# Patient Record
Sex: MALE | Race: WHITE | Employment: FULL TIME | URBAN - METROPOLITAN AREA
[De-identification: names, ages, dates, MRNs, and addresses within clinical notes are randomized per-mention and may not be internally consistent; named-entity substitution may affect disease eponyms.]

---

## 2020-06-25 PROBLEM — E66.01 MORBID OBESITY DUE TO EXCESS CALORIES (HCC): Status: ACTIVE | Noted: 2020-06-25

## 2020-06-25 PROBLEM — N28.9 KIDNEY DISEASE: Status: ACTIVE | Noted: 2020-06-25

## 2020-06-25 PROBLEM — I10 ESSENTIAL HYPERTENSION: Status: ACTIVE | Noted: 2020-06-25

## 2020-08-11 ENCOUNTER — HOSPITAL ENCOUNTER (OUTPATIENT)
Dept: GENERAL RADIOLOGY | Age: 45
Discharge: HOME | End: 2020-08-11
Attending: NURSE PRACTITIONER
Payer: COMMERCIAL

## 2020-08-11 ENCOUNTER — HOSPITAL ENCOUNTER (OUTPATIENT)
Dept: LAB | Age: 45
Discharge: HOME | End: 2020-08-11
Payer: COMMERCIAL

## 2020-08-11 DIAGNOSIS — Z87.19 H/O ESOPHAGEAL REFLUX: ICD-10-CM

## 2020-08-11 DIAGNOSIS — Z01.812 ENCOUNTER FOR PRE-OPERATIVE LABORATORY TESTING: ICD-10-CM

## 2020-08-11 LAB
25(OH)D3 SERPL-MCNC: 26.8 NG/ML (ref 30–100)
EST. AVERAGE GLUCOSE BLD GHB EST-MCNC: 128 MG/DL
HBA1C MFR BLD: 6.1 %
TSH SERPL DL<=0.005 MIU/L-ACNC: 2.32 UIU/ML

## 2020-08-11 PROCEDURE — 36415 COLL VENOUS BLD VENIPUNCTURE: CPT

## 2020-08-11 PROCEDURE — 74011000255 HC RX REV CODE- 255: Performed by: NURSE PRACTITIONER

## 2020-08-11 PROCEDURE — 82306 VITAMIN D 25 HYDROXY: CPT

## 2020-08-11 PROCEDURE — 84443 ASSAY THYROID STIM HORMONE: CPT

## 2020-08-11 PROCEDURE — 83036 HEMOGLOBIN GLYCOSYLATED A1C: CPT

## 2020-08-11 PROCEDURE — 74246 X-RAY XM UPR GI TRC 2CNTRST: CPT

## 2020-08-11 PROCEDURE — 80323 ALKALOIDS NOS: CPT

## 2020-08-11 PROCEDURE — 74011000250 HC RX REV CODE- 250: Performed by: NURSE PRACTITIONER

## 2020-08-11 RX ADMIN — BARIUM SULFATE 135 ML: 980 POWDER, FOR SUSPENSION ORAL at 10:35

## 2020-08-11 RX ADMIN — ANTACID/ANTIFLATULENT 4 G: 380; 550; 10; 10 GRANULE, EFFERVESCENT ORAL at 10:35

## 2020-08-11 RX ADMIN — BARIUM SULFATE 355 ML: 0.6 SUSPENSION ORAL at 10:35

## 2020-08-16 LAB
COTININE SERPL-MCNC: <1 NG/ML
NICOTINE SERPL-MCNC: <1 NG/ML

## 2020-08-17 ENCOUNTER — HOSPITAL ENCOUNTER (OUTPATIENT)
Dept: PHYSICAL THERAPY | Age: 45
Discharge: HOME | End: 2020-08-17
Attending: NURSE PRACTITIONER
Payer: COMMERCIAL

## 2020-08-17 DIAGNOSIS — E66.01 MORBID OBESITY (HCC): ICD-10-CM

## 2020-08-17 PROCEDURE — 97161 PT EVAL LOW COMPLEX 20 MIN: CPT

## 2020-08-17 PROCEDURE — 97110 THERAPEUTIC EXERCISES: CPT

## 2020-08-17 NOTE — PROGRESS NOTES
Gustavo Preston  : 1975  Payor: Shana Holley / Plan: [place] OF [address] [place] [address] Rd / Product Type: PPO /  [facility]  [address] [place] [address], [address], [address] [facility]  [phone]   [phone]       OUTPATIENT PHYSICAL THERAPY: Daily Treatment Note 2020  Visit Count: 1  ICD-10: Treatment Diagnosis: Low back pain (M54.5), Pain in left leg (M79.605)  Precautions/Allergies: NKDA      TREATMENT PLAN:  Effective Dates: 2020 TO 10/16/2020 (60 days). Frequency/Duration:PRN for 30 Day(s) MEDICAL/REFERRING DIAGNOSIS:  Morbid obesity ([address] [E66.01]    DATE OF ONSET: gradual onset  REFERRING PHYSICIAN: Matheus Donahue, *  MD Orders: Juan David Miller and treat  Return MD Appointment: 20          Pre-treatment Symptoms/Complaints:  See initial evaluation  Pain: Initial: Pain Intensity 1: 2(2 now and at best, 8 at worst)   Post Session:  Pain not rated at end of session   Medications Last Reviewed:  20    Updated Objective Findings:   See evaluation note from today     TREATMENT:     Therapeutic Exercise: (10 Minutes):  Exercises to improve mobility and strength. Required moderate visual and verbal cues to instruct in exercises. Progressed complexity of movement as indicated. Patient was instructed in seated hamstring stretch with 30 second hold, trunk flex will ball and trunk diagonals with ball, and side lying L hip abduction. He performed reps of each to show sufficient understanding. HEP: as above  [employer]    Treatment/Session Summary:    · Response to Treatment:  no increased pain reported. .  · Communication/Consultation:  None today  · Equipment provided today:  None today  · Recommendations/Intent for next treatment session: patient need not return to PT unless follow up is desired.      Total Treatment Billable Duration:  10 minutes exercise, 30 minutes evaluation  PT Patient Time In/Time Out  Time In: 1000  Time Out: Rebecca 180 RADHA SANTAMARIA PT

## 2020-08-17 NOTE — THERAPY EVALUATION
Dina Allen  : 1975  Primary: [facility] [facility] [place]*  Secondary:  [facility]  [address] [facility], [address] [place] [address],[address], [place] [address].  [phone]   [phone]       OUTPATIENT PHYSICAL THERAPY:Initial Assessment 2020     ICD-10: Treatment Diagnosis: Low back pain (M54.5), Pain in left leg (M79.605)  Precautions/Allergies: NKDA      TREATMENT PLAN:  Effective Dates: 2020 TO 2020 (30 days). Frequency/Duration:PRN for 30 Day(s) MEDICAL/REFERRING DIAGNOSIS:  Morbid obesity ([address] [E66.01]    DATE OF ONSET: gradual onset  REFERRING PHYSICIAN: Kalee Dowd, *  MD Orders: Sonia Herrera and treat  Return MD Appointment: 20     INITIAL ASSESSMENT:  Mr. Radha Reyes is a 39year old male who comes to PT in anticipation of having bariatric surgery. He presents with very tight hamstrings, pain in low back and leg with history of several back surgeries, weak abdominals and weakness in L LE. He was given HEP to work on in anticipation of bariatric surgery, He is to contact PT within 30 days if he feels he needs clarification of HEP or return visit to PT. Otherwise he will be discharged at the end of 30 days. PROBLEM LIST (Impacting functional limitations):  1. Decreased Strength  2. Increased Pain  3. Decreased Flexibility/Joint Mobility INTERVENTIONS PLANNED: (Treatment may consist of any combination of the following)  1. Home Exercise Program (HEP)  2. Therapeutic Exercise/Strengthening     GOALS: (Goals have been discussed and agreed upon with patient.)  Patient's stated goal is to lessen his back pain  Short-Term Functional Goals: None set  Discharge Goals: Time Frame: 30 days  1.  Patient to be independent with HEP    OUTCOME MEASURE:   Tool Used: Lower Extremity Functional Scale (LEFS)  Score:  Initial:  Most Recent:  (Date: -- )   Interpretation of Score: 20 questions each scored on a 5 point scale with 0 representing \"extreme difficulty or unable to perform\" and 4 representing \"no difficulty\". The lower the score, the greater the functional disability. 80/80 represents no disability. Minimal detectable change is 9 points. MEDICAL NECESSITY:   · Patient demonstrates good rehab potential due to higher previous functional level. REASON FOR SERVICES/OTHER COMMENTS:  · Patient continues to require skilled intervention due to desire to return to higher level of function. .  Total Duration:  30 minuets evaluation, 10 minutes exercise  PT Patient Time In/Time Out  Time In: 1000  Time Out: 1040    Rehabilitation Potential For Stated Goals: Good  Regarding [employer] therapy, I certify that the treatment plan above will be carried out by a therapist or under their direction. Thank you for this referral,    Quan Lechuga, PT      Referring Physician Signature: Yesika Parada, * No Signature is Required for this note. PAIN/SUBJECTIVE:   Initial: Pain Intensity 1: 2(2 now and at best, 8 at worst)   Post Session:  No change noted by patient   HISTORY:   History of Injury/Illness (Reason for Referral):  Patient reports gradual gaining of weight since high school. He is unable to walk as much as he would like to lose weight because of history of multiple back surgeries. He also has a spinal cord stimulator,  Past Medical History/Comorbidities: Morbid obesity, renal disease, HTN. Back surgeries in 2001, 2002, 2009 and spinal cord stimulator in 2012. Social History/Living Environment:     lives with family in one story home with 2 entry steps. Prior Level of Function/Work/Activity:  Works as a pawn      Ambulatory/Rehab [address] Factors:       (1)  Gender [Male]    Ability to Rise from [address]:       (1)  Pushes up, successful in one attempt   [address]:       No modifications necessary   Total: (5 or greater = High Risk): 2   ©2010 [employer] of [employer] Patent #[id number]. Federal Law prohibits the replication, distribution or use without written permission from [facility] of [address]   Current Medications: from EMR: Catapres, Bystolic, Amlodipine   Date Last Reviewed:  8/17/20   Number of Personal Factors/Comorbidities that affect the Plan of Care: 0: LOW COMPLEXITY       EXAMINATION:     Observation/Orthostatic Postural Assessment: Patient with morbid obesity. Palpation:  Pelvic landmarks symmetrical.     ROM: B UEs and LEs WFL except as limited by adipose tissue. Very tight in B hamstrings. Some limitations to lumbar forward flexion. Strength: generally WFL in B UEs and R LE. Some weakness in L knee flex and extn, L DF, L hip abduction. Weakness in upper and lower abs. Neurological Screen: light touch intact in B UEs and LEs  Functional Mobility:  independent       Balance:  SLS greater than 5 seconds each leg. Body Structures Involved:  1. Joints  2. Muscles Body Functions Affected:  1. Neuromusculoskeletal  2. Movement Related Activities and Participation Affected:  1. General Tasks and Demands  2.  Interpersonal Interactions and Relationships   Number of elements (examined above) that affect the Plan of Care: 1-2: LOW COMPLEXITY   CLINICAL PRESENTATION:   Presentation: Evolving clinical presentation with changing clinical characteristics: MODERATE COMPLEXITY   CLINICAL DECISION MAKING:   Use of outcome tool(s) and clinical judgement create a POC that gives a: Questionable prediction of patient's progress: MODERATE COMPLEXITY

## 2020-12-07 NOTE — H&P (VIEW-ONLY)
Chriss Brandon MD 
                                [address] [facility], [address] [facility] [address] Phone [phone], Fax [phone] History and Physical 
 
Patient: Earl Dotson MRN: [id number]  SSN: [id number] YOB: 1975  Age: 39 y.o. Sex: male PCP: Tayla Hernandez NP Date:  12/8/2020 Earl Dotson returns to the [facility] after successful completion of our multidisciplinary surgical prep program.  This is his final consultation preparing him for laparoscopic sleeve gastrectomy surgery. He presents with a height of 5' 10\" (1.778 m) and weight of 330 lb (149.7 kg), giving him a Body mass index is 47.35 kg/m². He has an Ideal body weight of 169 lbs, and excess body weight of 161 lbs. He started our program with a weight of 318 lbs. He has completed all aspects of our prep program and has been deemed an acceptable candidate for bariatric surgery. PSYCHOLOGICAL EVALUATION:   Completed with [facility] on 11/16/2020 deeming him and appropriate surgical candidate. DIETITIAN EVALUATION:  Completed with [facility] deeming him an appropriate surgical candidate. BARIATRIC LABS:   
Component Latest Ref Rng & Units 8/11/2020 8/11/2020 8/11/2020 8/11/2020  
 
     11:25 AM 11:25 AM 11:25 AM 11:25 AM  
Nicotine 
    ng/mL <1.0 Cotinine 
    ng/mL <1.0 Hemoglobin A1c, (calculated) 
    %    6.1 Est. average glucose 
    mg/dL    128 Vitamin D 25-Hydroxy 30.0 - 100.0 ng/mL  26.8 (L) TSH 
    uIU/mL   2.320 Previously discussed low Vitamin D level on another visit. UPPER GI:  Completed 08/11/2020 History: Prebariatric surgery EXAM: Upper GI with KUB FINDINGS: The lung bases are clear. The bowel gas pattern is nonspecific. 1.6 minutes fluoroscopy time is utilized. 16 fluoroscopic images are available for review. The patient ingested effervescent granules followed by thick and 
thin consistencies of barium under direct fluoroscopic evaluation. Contrast flows through the esophagus into the stomach without difficulty. There 
is no esophageal mass, ulceration, or stricture. There is trace gastroesophageal 
reflux. Contrast flows through the stomach into the proximal small bowel without 
difficulty. No gastric mass, polyp, or ulceration. The duodenal bulb and sweep 
are normal. 
  
IMPRESSION IMPRESSION: Trace gastroesophageal reflux. No additional abnormality. PHYSICAL THERAPY EVALUATION FOR MOBILITY OPTIMIZATION:   Completed 08/17/2020 by Laqueta Soulier, PT, documentation in note section of chart We have reviewed the procedure again today and completed our standard pre op teaching. His questions were answered and he is ready to schedule surgery. We have discussed the procedure, diet and exercise regimens, and potential complications of surgery. The patient understands the nature and potential complication of surgery and has the capacity to follow the post operative diet, exercise, and nutritional requirements. After review, he has signed his surgical consent today. MEDICAL HISTORY: 
Morbid Obesity Kidney Disease Chronic Back pain- Sees pain management  
  
Comorbidity Yes or No  
Obstructive Sleep Apnea CPAP/BIPAP use= No  
Diabetes Mellitus Insulin dependent = Last A1c= No  
Hypertension- 
Number of medications=4 Yes Gastroesophageal Reflux Treatment Med= No  
Hyperlipidemia with medication No  
Coronary Artery Disease No  
Cancer No  
Asthma No  
Osteoarthritis No  
Joint Pain No  
  
  
Other Yes or No  
Venous Stasis No  
Dialysis No  
Long term use of steroids or immunocompromised conditions No  
On Anticoagulants No  
  
  
  
PRIOR WEIGHT LOSS ATTEMPTS:  4-10 attempts including exercise, diet modifications 
  
 EXERCISE ASSESSMENT:  None Currently, will review NIH Guidelines with patient.  
  
DENTAL ISSUES:  No dental issues with chewing  
  
PSYCHOSOCIAL: 
He notes he  is  and states main support person is [employer] (Spouse). He is Disabled. His goal in pursuing surgical weight loss is to improve health. Denies history of mental health disorders. He states he is independent in his care, can drive a car, and can ambulate without assistance. 
  
 
 
HISTORY: 
Past Medical History:  
Diagnosis Date  Morbid obesity ([address]  Renal disease He denies personal or family history of problems with anesthesia, bleeding, or clotting. He denies history of DVT or pulmonary embolism. He denies reflux or dysphagia. Past Surgical History:  
Procedure Laterality Date John Back  [id number],[id number], 2009 L5S1 Discectomy Social History Tobacco Use  Smoking status: Never Smoker  Smokeless tobacco: Never Used Substance Use Topics  Alcohol use: Not Currently  Drug use: Never Family History Problem Relation Age of Onset  Cancer Mother MEDICATIONS: 
Current Outpatient Medications Medication Sig  
 omeprazole (PRILOSEC) 40 mg capsule Take 1 Cap by mouth daily. Indications: gastroesophageal reflux disease  oxyCODONE-acetaminophen (Percocet) 5-325 mg per tablet Take 1 Tab by mouth every six (6) hours as needed for Pain for up to 5 days. Max Daily Amount: 4 Tabs.  ondansetron hcl (ZOFRAN) 8 mg tablet Take 1 Tab by mouth every eight (8) hours as needed for Nausea or Vomiting. Indications: prevent nausea and vomiting after surgery  cloNIDine HCL (CATAPRES) 0.1 mg tablet Take  by mouth two (2) times a day.  nebivoloL (Bystolic) 20 mg tablet Take  by mouth daily.  Olmesartan-amLODIPine-HCTZ 40-10-25 mg tab Take  by mouth. No current facility-administered medications for this visit. ALLERGIES: 
No Known Allergies ROS: The patient has no difficulty with chest pain or shortness of breath. No fever or chills. Comprehensive 13 point review of systems was otherwise unremarkable except as noted above. PHYSICAL EXAM:  
Visit Vitals /74 Ht 5' 10\" (1.778 m) Wt 330 lb (149.7 kg) BMI 47.35 kg/m² General: Alert oriented cooperative white male in no acute distress. Eyes: Sclera are clear. Conjunctiva and lids within normal limits. No icterus. Ears and Nose: no gross deformities to visual inspection, gross hearing intact Neck: Supple, trachea midline, no appreciable thyromegaly Resp: No JVD. Breathing is  non-labored. Lungs clear to auscultation without wheezing or rhonchi CV: RRR. No murmurs, rubs or gallops appreciated, Carotid bruits are absent Abd: soft non-tender and non-distended without peritoneal signs. +bs Psych:  Mood and affect appropriate. Short-term memory and understanding intact ASSESSMENT: 
Morbid obesity with a  Body mass index is 47.35 kg/m². ready for laparoscopic sleeve gastrectomy surgery. 30-day Bariatric Surgical Risk Percentage: 2.72% PLAN: 
1.  Schedule for laparoscopic, possible open, sleeve gastrectomy, in the near future. 2.  Patient will complete our 2 week pre operative diet. 3.  Bring CPAP and incentive spirometer( given with our standard instruction to use BID 2 weeks prior to surgery) to hospital on day of surgery. 4.  The patient received prescription to use after surgery for :  Percocet, Zofran, and Prilosec. I went over the risks and benefits with the patient. For risks I went over problems with bleeding, infection and anesthesia. I also noted potential problems of injury to the esophagus, liver, spleen, stomach, pancreas, small bowel and or colon. I addition, I discussed potential problems of DVT/pulmonary embolism, urinary tract infection, wound infection, myocardial infarction, stroke and the potential need to convert to an open procedure. I quoted a 1% nationwide mortality rate for this procedure. He has signed our extensive consent form which is in the chart. Capo Burr MD 
 
 
 
Date:  12/8/2020

## 2020-12-15 ENCOUNTER — HOSPITAL ENCOUNTER (OUTPATIENT)
Dept: SURGERY | Age: 45
Discharge: HOME | End: 2020-12-15
Attending: SURGERY
Payer: COMMERCIAL

## 2020-12-15 VITALS — BODY MASS INDEX: 46.65 KG/M2 | HEIGHT: 69 IN | WEIGHT: 315 LBS

## 2020-12-15 LAB
ALBUMIN SERPL-MCNC: 4.1 G/DL (ref 3.5–5)
ALBUMIN/GLOB SERPL: 1 {RATIO} (ref 1.2–3.5)
ALP SERPL-CCNC: 56 U/L (ref 50–136)
ALT SERPL-CCNC: 77 U/L (ref 12–65)
ANION GAP SERPL CALC-SCNC: 5 MMOL/L (ref 7–16)
AST SERPL-CCNC: 43 U/L (ref 15–37)
BILIRUB SERPL-MCNC: 0.3 MG/DL (ref 0.2–1.1)
BUN SERPL-MCNC: 16 MG/DL (ref 6–23)
CALCIUM SERPL-MCNC: 9.7 MG/DL (ref 8.3–10.4)
CHLORIDE SERPL-SCNC: 103 MMOL/L (ref 98–107)
CO2 SERPL-SCNC: 33 MMOL/L (ref 21–32)
CREAT SERPL-MCNC: 1.03 MG/DL (ref 0.8–1.5)
ERYTHROCYTE [DISTWIDTH] IN BLOOD BY AUTOMATED COUNT: 12.8 % (ref 11.9–14.6)
EST. AVERAGE GLUCOSE BLD GHB EST-MCNC: 126 MG/DL
GLOBULIN SER CALC-MCNC: 4.2 G/DL (ref 2.3–3.5)
GLUCOSE SERPL-MCNC: 73 MG/DL (ref 65–100)
HBA1C MFR BLD: 6 %
HCT VFR BLD AUTO: 44.2 % (ref 41.1–50.3)
HGB BLD-MCNC: 15 G/DL (ref 13.6–17.2)
MCH RBC QN AUTO: 28 PG (ref 26.1–32.9)
MCHC RBC AUTO-ENTMCNC: 33.9 G/DL (ref 31.4–35)
MCV RBC AUTO: 82.6 FL (ref 79.6–97.8)
NRBC # BLD: 0 K/UL (ref 0–0.2)
PLATELET # BLD AUTO: 270 K/UL (ref 150–450)
PMV BLD AUTO: 9.7 FL (ref 9.4–12.3)
POTASSIUM SERPL-SCNC: 3.8 MMOL/L (ref 3.5–5.1)
PROT SERPL-MCNC: 8.3 G/DL (ref 6.3–8.2)
RBC # BLD AUTO: 5.35 M/UL (ref 4.23–5.6)
SODIUM SERPL-SCNC: 141 MMOL/L (ref 136–145)
WBC # BLD AUTO: 8.8 K/UL (ref 4.3–11.1)

## 2020-12-15 PROCEDURE — 80053 COMPREHEN METABOLIC PANEL: CPT

## 2020-12-15 PROCEDURE — 83036 HEMOGLOBIN GLYCOSYLATED A1C: CPT

## 2020-12-15 PROCEDURE — 36415 COLL VENOUS BLD VENIPUNCTURE: CPT

## 2020-12-15 PROCEDURE — 85027 COMPLETE CBC AUTOMATED: CPT

## 2020-12-15 NOTE — PERIOP NOTES
PLEASE CONTINUE TAKING ALL PRESCRIPTION MEDICATIONS UP TO THE DAY OF SURGERY UNLESS OTHERWISE DIRECTED BELOW. DISCONTINUE all vitamins and supplements 7 days prior to surgery. DISCONTINUE Non-Steriodal Anti-Inflammatory (NSAIDS) such as Advil and Aleve 5 days prior to surgery. Home Medications to take  the day of surgery   Bystolic           Home Medications   to Hold   none        Comments          *Visitor policy of 1 visitor per patient discussed. Please do not bring home medications with you on the day of surgery unless otherwise directed by your nurse. If you are instructed to bring home medications, please give them to your nurse as they will be administered by the nursing staff. If you have any questions, please call [facility] Mishel Gardner [phone] or [facility] [phone]. A copy of this note was provided to the patient for reference.

## 2020-12-15 NOTE — PERIOP NOTES
SURGICAL WEIGHT LOSS Enhanced Recovery After Surgery: diabetic and non-diabetic patients      The night before surgery 12/21/2020, drink 1 bottles of the Ensure Pre-Surgery drink. The morning of surgery 12/22/2020, drink 1/2 bottle of the Ensure Pre-Surgery drink while on your way to the hospital. Drink this over 5-10 minutes. Drink nothing else after drinking the pre-surgical drink the morning of surgery. Bring your patient handbook with you to the hospital.        Things to Remember:      1. You will be up in a chair the evening of surgery and sipping on clear liquids, once released by your surgeon. 2. Beginning the day of surgery, you will be out of the bed as able, once released by your hospital team. We encourage you to be sitting up in a chair, walking in the davis, doing exercises as advised by physical therapy, etc.       3. You will be given scheduled non-narcotic pain medication to help keep your pain under control. You will have stronger pain medication ordered for break through pain. 4. All of these measures are geared toward improving your overall surgical recovery and   decreasing the risk of complications.

## 2020-12-15 NOTE — PERIOP NOTES
Patient verified name and     Order for consent was found in EHR and matches case posting; patient verified. Type 2 surgery, PAT walk in assessment complete. Labs per surgeon: CBC, CMP, HgbA1c; results pending  Labs per anesthesia protocol: No additional lab required  EKG: not required per anesthesia guidelines    Patient aware that a negative Covid swab result is required to proceed with surgery;  appointments are made by the surgeon office and test should be collected 7 days prior to surgery. The testing center is located at the . [address], [place]. Hospital approved surgical skin cleanser and instructions given per hospital policy. Patient provided with and instructed on educational handouts including Guide to Surgery, Pain Management, Hand Hygiene, Blood Transfusion Education, and [facility] Brochure. Patient answered medical/surgical history questions at their best of ability. All prior to admission medications documented in [facility]. Original medication prescription bottle not visualized during patient appointment. Patient instructed to hold all vitamins 7 days prior to surgery and NSAIDS 5 days prior to surgery, patient verbalized understanding. Patient teach back successful and patient demonstrates knowledge of instructions.

## 2020-12-17 NOTE — H&P
Amauri Hightower MD                                  [address] [facility] [address], [address] [address]                                  Phone [phone], Fax [phone]    History and Physical    Patient: Dionicio Graham MRN: [id number]  SSN: [id number]    YOB: 1975  Age: 39 y.o. Sex: male              PCP: Juancarlos Love NP   Date:  12/16/2020        Dionicio Graham returns to the [facility] after successful completion of our multidisciplinary surgical prep program.  This is his final consultation preparing him for laparoscopic sleeve gastrectomy surgery. He presents with a height of   and weight of  , giving him a There is no height or weight on file to calculate BMI. He has an Ideal body weight of 169 lbs, and excess body weight of 161 lbs. He started our program with a weight of 318 lbs. He has completed all aspects of our prep program and has been deemed an acceptable candidate for bariatric surgery. PSYCHOLOGICAL EVALUATION:   Completed with HARJEET Holt on 11/16/2020 deeming him and appropriate surgical candidate. DIETITIAN EVALUATION:  Completed with Katarzyna Malin deeming him an appropriate surgical candidate. BARIATRIC LABS:    Component      Latest Ref Rng & Units 8/11/2020 8/11/2020 8/11/2020 8/11/2020          11:25 AM 11:25 AM 11:25 AM 11:25 AM   Nicotine      ng/mL <1.0      Cotinine      ng/mL <1.0      Hemoglobin A1c, (calculated)      %    6.1   Est. average glucose      mg/dL    128   Vitamin D 25-Hydroxy      30.0 - 100.0 ng/mL  26.8 (L)     TSH      uIU/mL   2.320    Previously discussed low Vitamin D level on another visit. UPPER GI:  Completed 08/11/2020  History: Prebariatric surgery  EXAM: Upper GI with KUB  FINDINGS: The lung bases are clear. The bowel gas pattern is nonspecific. 1.6 minutes fluoroscopy time is utilized. 16 fluoroscopic images are available  for review.  The patient ingested effervescent granules followed by thick and  thin consistencies of barium under direct fluoroscopic evaluation. Contrast flows through the esophagus into the stomach without difficulty. There  is no esophageal mass, ulceration, or stricture. There is trace gastroesophageal  reflux. Contrast flows through the stomach into the proximal small bowel without  difficulty. No gastric mass, polyp, or ulceration. The duodenal bulb and sweep  are normal.     IMPRESSION  IMPRESSION: Trace gastroesophageal reflux. No additional abnormality. PHYSICAL THERAPY EVALUATION FOR MOBILITY OPTIMIZATION:   Completed 08/17/2020 by Wellington Canavan, PT, documentation in note section of chart     We have reviewed the procedure again today and completed our standard pre op teaching. His questions were answered and he is ready to schedule surgery. We have discussed the procedure, diet and exercise regimens, and potential complications of surgery. The patient understands the nature and potential complication of surgery and has the capacity to follow the post operative diet, exercise, and nutritional requirements. After review, he has signed his surgical consent today.       MEDICAL HISTORY:  Morbid Obesity  Kidney Disease  Chronic Back pain- Sees pain management      Comorbidity Yes or No   Obstructive Sleep Apnea  CPAP/BIPAP use= No   Diabetes Mellitus  Insulin dependent =  Last A1c= No   Hypertension-  Number of medications=4 Yes   Gastroesophageal Reflux  Treatment Med= No   Hyperlipidemia with medication No   Coronary Artery Disease No   Cancer No   Asthma No   Osteoarthritis No   Joint Pain No         Other Yes or No   Venous Stasis No   Dialysis No   Long term use of steroids or immunocompromised conditions No   On Anticoagulants No            PRIOR WEIGHT LOSS ATTEMPTS:  4-10 attempts including exercise, diet modifications     EXERCISE ASSESSMENT:  None Currently, will review NIH Guidelines with patient.      DENTAL ISSUES:  No dental issues with chewing      PSYCHOSOCIAL:  He notes he  is  and states main support person is [employer] (Spouse). He is Disabled. His goal in pursuing surgical weight loss is to improve health. Denies history of mental health disorders. He states he is independent in his care, can drive a car, and can ambulate without assistance.         HISTORY:  Past Medical History:   Diagnosis Date    Hypertension     Morbid obesity ([address]     Renal disease        He denies personal or family history of problems with anesthesia, bleeding, or clotting. He denies history of DVT or pulmonary embolism. He denies reflux or dysphagia. Past Surgical History:   Procedure Laterality Date   Zenon Stewart  [id number], 2009    L5S1 Discectomy    HX OTHER SURGICAL      Spinal cord stimulator     Social History     Tobacco Use    Smoking status: Never Smoker    Smokeless tobacco: Never Used   Substance Use Topics    Alcohol use: Not Currently    Drug use: Never     Family History   Problem Relation Age of Onset    Cancer Mother         MEDICATIONS:  No current facility-administered medications for this encounter. Current Outpatient Medications   Medication Sig    omeprazole (PRILOSEC) 40 mg capsule Take 1 Cap by mouth daily. Indications: gastroesophageal reflux disease    ondansetron hcl (ZOFRAN) 8 mg tablet Take 1 Tab by mouth every eight (8) hours as needed for Nausea or Vomiting. Indications: prevent nausea and vomiting after surgery    nebivoloL (Bystolic) 20 mg tablet Take  by mouth daily.  Olmesartan-amLODIPine-HCTZ 40-10-25 mg tab Take  by mouth. ALLERGIES:  No Known Allergies    ROS: The patient has no difficulty with chest pain or shortness of breath. No fever or chills. Comprehensive 13 point review of systems was otherwise unremarkable except as noted above. PHYSICAL EXAM:   There were no vitals taken for this visit.     General: Alert oriented cooperative white male in no acute distress. Eyes: Sclera are clear. Conjunctiva and lids within normal limits. No icterus. Ears and Nose: no gross deformities to visual inspection, gross hearing intact  Neck: Supple, trachea midline, no appreciable thyromegaly  Resp: No JVD. Breathing is  non-labored. Lungs clear to auscultation without wheezing or rhonchi   CV: RRR. No murmurs, rubs or gallops appreciated, Carotid bruits are absent  Abd: soft non-tender and non-distended without peritoneal signs. +bs    Psych:  Mood and affect appropriate. Short-term memory and understanding intact      ASSESSMENT:  Morbid obesity with a  There is no height or weight on file to calculate BMI. ready for laparoscopic sleeve gastrectomy surgery. 30-day Bariatric Surgical Risk Percentage: 2.72%    PLAN:  1.  Schedule for laparoscopic, possible open, sleeve gastrectomy, in the near future. 2.  Patient will complete our 2 week pre operative diet. 3.  Bring CPAP and incentive spirometer( given with our standard instruction to use BID 2 weeks prior to surgery) to hospital on day of surgery. 4.  The patient received prescription to use after surgery for :  Percocet, Zofran, and Prilosec. I went over the risks and benefits with the patient. For risks I went over problems with bleeding, infection and anesthesia. I also noted potential problems of injury to the esophagus, liver, spleen, stomach, pancreas, small bowel and or colon. I addition, I discussed potential problems of DVT/pulmonary embolism, urinary tract infection, wound infection, myocardial infarction, stroke and the potential need to convert to an open procedure. I quoted a 1% nationwide mortality rate for this procedure. He has signed our extensive consent form which is in the chart.        Dominick Kahn MD        Date:  12/16/2020

## 2020-12-21 ENCOUNTER — ANESTHESIA EVENT (OUTPATIENT)
Dept: SURGERY | Age: 45
DRG: 621 | End: 2020-12-21
Payer: COMMERCIAL

## 2020-12-21 RX ORDER — MIDAZOLAM HYDROCHLORIDE 1 MG/ML
2 INJECTION, SOLUTION INTRAMUSCULAR; INTRAVENOUS ONCE
Status: CANCELLED | OUTPATIENT
Start: 2020-12-21 | End: 2020-12-21

## 2020-12-21 RX ORDER — FENTANYL CITRATE 50 UG/ML
100 INJECTION, SOLUTION INTRAMUSCULAR; INTRAVENOUS ONCE
Status: CANCELLED | OUTPATIENT
Start: 2020-12-21 | End: 2020-12-21

## 2020-12-22 ENCOUNTER — ANESTHESIA (OUTPATIENT)
Dept: SURGERY | Age: 45
DRG: 621 | End: 2020-12-22
Payer: COMMERCIAL

## 2020-12-22 ENCOUNTER — HOSPITAL ENCOUNTER (INPATIENT)
Age: 45
LOS: 1 days | Discharge: HOME | DRG: 621 | End: 2020-12-23
Attending: SURGERY | Admitting: SURGERY
Payer: COMMERCIAL

## 2020-12-22 DIAGNOSIS — E66.01 MORBID OBESITY WITH BMI OF 45.0-49.9, ADULT (HCC): ICD-10-CM

## 2020-12-22 PROCEDURE — 76210000006 HC OR PH I REC 0.5 TO 1 HR: Performed by: SURGERY

## 2020-12-22 PROCEDURE — 77030034208 HC SLV GASTRCTMY 3D CAL SYS DISP BOEH -C: Performed by: SURGERY

## 2020-12-22 PROCEDURE — 74011250636 HC RX REV CODE- 250/636: Performed by: NURSE ANESTHETIST, CERTIFIED REGISTERED

## 2020-12-22 PROCEDURE — 77030041460 HC APL VISTASEAL J&J -B: Performed by: SURGERY

## 2020-12-22 PROCEDURE — 43775 LAP SLEEVE GASTRECTOMY: CPT | Performed by: SURGERY

## 2020-12-22 PROCEDURE — 74011250636 HC RX REV CODE- 250/636: Performed by: NURSE PRACTITIONER

## 2020-12-22 PROCEDURE — 74011000250 HC RX REV CODE- 250: Performed by: NURSE ANESTHETIST, CERTIFIED REGISTERED

## 2020-12-22 PROCEDURE — 74011250637 HC RX REV CODE- 250/637: Performed by: ANESTHESIOLOGY

## 2020-12-22 PROCEDURE — 77030040361 HC SLV COMPR DVT MDII -B: Performed by: SURGERY

## 2020-12-22 PROCEDURE — 77030037088 HC TUBE ENDOTRACH ORAL NSL COVD-A: Performed by: ANESTHESIOLOGY

## 2020-12-22 PROCEDURE — 2709999900 HC NON-CHARGEABLE SUPPLY

## 2020-12-22 PROCEDURE — 77030002912 HC SUT ETHBND J&J -A: Performed by: SURGERY

## 2020-12-22 PROCEDURE — 77030008606 HC TRCR ENDOSC KII AMR -B: Performed by: SURGERY

## 2020-12-22 PROCEDURE — 74011000250 HC RX REV CODE- 250: Performed by: SURGERY

## 2020-12-22 PROCEDURE — 77030008608 HC TRCR ENDOSC SMTH AMR -B: Performed by: SURGERY

## 2020-12-22 PROCEDURE — 77030010515 HC APPL ENDOCLP LIG J&J -B: Performed by: SURGERY

## 2020-12-22 PROCEDURE — 77030021678 HC GLIDESCP STAT DISP VERT -B: Performed by: ANESTHESIOLOGY

## 2020-12-22 PROCEDURE — 77030040361 HC SLV COMPR DVT MDII -B

## 2020-12-22 PROCEDURE — 77030027876 HC STPLR ENDOSC FLX PWR J&J -G1: Performed by: SURGERY

## 2020-12-22 PROCEDURE — 76010000161 HC OR TIME 1 TO 1.5 HR INTENSV-TIER 1: Performed by: SURGERY

## 2020-12-22 PROCEDURE — 65270000029 HC RM PRIVATE

## 2020-12-22 PROCEDURE — 77030010507 HC ADH SKN DERMBND J&J -B: Performed by: SURGERY

## 2020-12-22 PROCEDURE — 77030041524 HC SEALNT FIBRN VITASEAL J&J -C: Performed by: SURGERY

## 2020-12-22 PROCEDURE — 77030012770 HC TRCR OPT FX AMR -B: Performed by: SURGERY

## 2020-12-22 PROCEDURE — 77030002933 HC SUT MCRYL J&J -A: Performed by: SURGERY

## 2020-12-22 PROCEDURE — 0DB64Z3 EXCISION OF STOMACH, PERCUTANEOUS ENDOSCOPIC APPROACH, VERTICAL: ICD-10-PCS | Performed by: SURGERY

## 2020-12-22 PROCEDURE — 94760 N-INVAS EAR/PLS OXIMETRY 1: CPT

## 2020-12-22 PROCEDURE — 88312 SPECIAL STAINS GROUP 1: CPT

## 2020-12-22 PROCEDURE — 77030009968 HC RELD STPLR ENDOSC J&J -D: Performed by: SURGERY

## 2020-12-22 PROCEDURE — 77010033678 HC OXYGEN DAILY

## 2020-12-22 PROCEDURE — 74011000250 HC RX REV CODE- 250: Performed by: ANESTHESIOLOGY

## 2020-12-22 PROCEDURE — 88305 TISSUE EXAM BY PATHOLOGIST: CPT

## 2020-12-22 PROCEDURE — 77030008603 HC TRCR ENDOSC EPATH J&J -C: Performed by: SURGERY

## 2020-12-22 PROCEDURE — 77030020829: Performed by: SURGERY

## 2020-12-22 PROCEDURE — 76060000033 HC ANESTHESIA 1 TO 1.5 HR: Performed by: SURGERY

## 2020-12-22 PROCEDURE — 77030008518 HC TBNG INSUF ENDO STRY -B: Performed by: SURGERY

## 2020-12-22 PROCEDURE — 77030034156 HC DEV TISS ENSEAL G2 STR J&J -F: Performed by: SURGERY

## 2020-12-22 PROCEDURE — 77030007956 HC PCH ENDOSC SPEC COVD -C: Performed by: SURGERY

## 2020-12-22 PROCEDURE — 77030008756 HC TU IRR SUC STRY -B: Performed by: SURGERY

## 2020-12-22 PROCEDURE — 74011250636 HC RX REV CODE- 250/636: Performed by: ANESTHESIOLOGY

## 2020-12-22 PROCEDURE — 77030040922 HC BLNKT HYPOTHRM STRY -A: Performed by: ANESTHESIOLOGY

## 2020-12-22 PROCEDURE — 74011250637 HC RX REV CODE- 250/637: Performed by: NURSE PRACTITIONER

## 2020-12-22 PROCEDURE — 2709999900 HC NON-CHARGEABLE SUPPLY: Performed by: SURGERY

## 2020-12-22 RX ORDER — EPHEDRINE SULFATE/0.9% NACL/PF 50 MG/5 ML
SYRINGE (ML) INTRAVENOUS AS NEEDED
Status: DISCONTINUED | OUTPATIENT
Start: 2020-12-22 | End: 2020-12-22 | Stop reason: HOSPADM

## 2020-12-22 RX ORDER — HYDROMORPHONE HYDROCHLORIDE 1 MG/ML
0.5 INJECTION, SOLUTION INTRAMUSCULAR; INTRAVENOUS; SUBCUTANEOUS
Status: DISCONTINUED | OUTPATIENT
Start: 2020-12-22 | End: 2020-12-23 | Stop reason: HOSPADM

## 2020-12-22 RX ORDER — KETOROLAC TROMETHAMINE 30 MG/ML
30 INJECTION, SOLUTION INTRAMUSCULAR; INTRAVENOUS EVERY 6 HOURS
Status: DISCONTINUED | OUTPATIENT
Start: 2020-12-22 | End: 2020-12-23 | Stop reason: HOSPADM

## 2020-12-22 RX ORDER — ALBUTEROL SULFATE 0.83 MG/ML
2.5 SOLUTION RESPIRATORY (INHALATION) AS NEEDED
Status: DISCONTINUED | OUTPATIENT
Start: 2020-12-22 | End: 2020-12-22 | Stop reason: HOSPADM

## 2020-12-22 RX ORDER — BUPIVACAINE HYDROCHLORIDE 5 MG/ML
INJECTION, SOLUTION EPIDURAL; INTRACAUDAL AS NEEDED
Status: DISCONTINUED | OUTPATIENT
Start: 2020-12-22 | End: 2020-12-22 | Stop reason: HOSPADM

## 2020-12-22 RX ORDER — PROPOFOL 10 MG/ML
INJECTION, EMULSION INTRAVENOUS AS NEEDED
Status: DISCONTINUED | OUTPATIENT
Start: 2020-12-22 | End: 2020-12-22 | Stop reason: HOSPADM

## 2020-12-22 RX ORDER — DIPHENHYDRAMINE HYDROCHLORIDE 50 MG/ML
12.5 INJECTION, SOLUTION INTRAMUSCULAR; INTRAVENOUS
Status: DISCONTINUED | OUTPATIENT
Start: 2020-12-22 | End: 2020-12-22 | Stop reason: HOSPADM

## 2020-12-22 RX ORDER — ACETAMINOPHEN 500 MG
1000 TABLET ORAL ONCE
Status: COMPLETED | OUTPATIENT
Start: 2020-12-22 | End: 2020-12-22

## 2020-12-22 RX ORDER — SODIUM CHLORIDE, SODIUM LACTATE, POTASSIUM CHLORIDE, CALCIUM CHLORIDE 600; 310; 30; 20 MG/100ML; MG/100ML; MG/100ML; MG/100ML
100 INJECTION, SOLUTION INTRAVENOUS CONTINUOUS
Status: DISCONTINUED | OUTPATIENT
Start: 2020-12-22 | End: 2020-12-23

## 2020-12-22 RX ORDER — GLYCOPYRROLATE 0.2 MG/ML
INJECTION INTRAMUSCULAR; INTRAVENOUS AS NEEDED
Status: DISCONTINUED | OUTPATIENT
Start: 2020-12-22 | End: 2020-12-22 | Stop reason: HOSPADM

## 2020-12-22 RX ORDER — FENTANYL CITRATE 50 UG/ML
INJECTION, SOLUTION INTRAMUSCULAR; INTRAVENOUS AS NEEDED
Status: DISCONTINUED | OUTPATIENT
Start: 2020-12-22 | End: 2020-12-22 | Stop reason: HOSPADM

## 2020-12-22 RX ORDER — DEXAMETHASONE SODIUM PHOSPHATE 4 MG/ML
INJECTION, SOLUTION INTRA-ARTICULAR; INTRALESIONAL; INTRAMUSCULAR; INTRAVENOUS; SOFT TISSUE AS NEEDED
Status: DISCONTINUED | OUTPATIENT
Start: 2020-12-22 | End: 2020-12-22 | Stop reason: HOSPADM

## 2020-12-22 RX ORDER — ONDANSETRON 2 MG/ML
4 INJECTION INTRAMUSCULAR; INTRAVENOUS ONCE
Status: DISCONTINUED | OUTPATIENT
Start: 2020-12-22 | End: 2020-12-22 | Stop reason: HOSPADM

## 2020-12-22 RX ORDER — NALOXONE HYDROCHLORIDE 0.4 MG/ML
0.4 INJECTION, SOLUTION INTRAMUSCULAR; INTRAVENOUS; SUBCUTANEOUS AS NEEDED
Status: DISCONTINUED | OUTPATIENT
Start: 2020-12-22 | End: 2020-12-23 | Stop reason: HOSPADM

## 2020-12-22 RX ORDER — LIDOCAINE HYDROCHLORIDE 10 MG/ML
0.1 INJECTION INFILTRATION; PERINEURAL AS NEEDED
Status: DISCONTINUED | OUTPATIENT
Start: 2020-12-22 | End: 2020-12-23 | Stop reason: HOSPADM

## 2020-12-22 RX ORDER — ONDANSETRON 2 MG/ML
4 INJECTION INTRAMUSCULAR; INTRAVENOUS EVERY 4 HOURS
Status: DISCONTINUED | OUTPATIENT
Start: 2020-12-22 | End: 2020-12-23 | Stop reason: HOSPADM

## 2020-12-22 RX ORDER — HYDROMORPHONE HYDROCHLORIDE 1 MG/ML
0.5 INJECTION, SOLUTION INTRAMUSCULAR; INTRAVENOUS; SUBCUTANEOUS
Status: CANCELLED | OUTPATIENT
Start: 2020-12-22

## 2020-12-22 RX ORDER — GABAPENTIN 100 MG/1
200 CAPSULE ORAL EVERY 12 HOURS
Status: DISCONTINUED | OUTPATIENT
Start: 2020-12-22 | End: 2020-12-23 | Stop reason: HOSPADM

## 2020-12-22 RX ORDER — SODIUM CHLORIDE, SODIUM LACTATE, POTASSIUM CHLORIDE, CALCIUM CHLORIDE 600; 310; 30; 20 MG/100ML; MG/100ML; MG/100ML; MG/100ML
100 INJECTION, SOLUTION INTRAVENOUS CONTINUOUS
Status: DISCONTINUED | OUTPATIENT
Start: 2020-12-22 | End: 2020-12-22 | Stop reason: HOSPADM

## 2020-12-22 RX ORDER — APREPITANT 40 MG/1
40 CAPSULE ORAL ONCE
Status: COMPLETED | OUTPATIENT
Start: 2020-12-22 | End: 2020-12-22

## 2020-12-22 RX ORDER — METOPROLOL TARTRATE 5 MG/5ML
1.25 INJECTION INTRAVENOUS
Status: CANCELLED | OUTPATIENT
Start: 2020-12-22

## 2020-12-22 RX ORDER — ACETAMINOPHEN 500 MG
1000 TABLET ORAL EVERY 6 HOURS
Status: DISCONTINUED | OUTPATIENT
Start: 2020-12-22 | End: 2020-12-23 | Stop reason: HOSPADM

## 2020-12-22 RX ORDER — MIDAZOLAM HYDROCHLORIDE 1 MG/ML
2 INJECTION, SOLUTION INTRAMUSCULAR; INTRAVENOUS
Status: COMPLETED | OUTPATIENT
Start: 2020-12-22 | End: 2020-12-22

## 2020-12-22 RX ORDER — HYDROMORPHONE HYDROCHLORIDE 2 MG/ML
0.5 INJECTION, SOLUTION INTRAMUSCULAR; INTRAVENOUS; SUBCUTANEOUS
Status: DISCONTINUED | OUTPATIENT
Start: 2020-12-22 | End: 2020-12-22 | Stop reason: HOSPADM

## 2020-12-22 RX ORDER — ONDANSETRON 2 MG/ML
INJECTION INTRAMUSCULAR; INTRAVENOUS AS NEEDED
Status: DISCONTINUED | OUTPATIENT
Start: 2020-12-22 | End: 2020-12-22 | Stop reason: HOSPADM

## 2020-12-22 RX ORDER — ENALAPRILAT 1.25 MG/ML
1.25 INJECTION INTRAVENOUS
Status: CANCELLED | OUTPATIENT
Start: 2020-12-22

## 2020-12-22 RX ORDER — LIDOCAINE HYDROCHLORIDE 20 MG/ML
INJECTION, SOLUTION EPIDURAL; INFILTRATION; INTRACAUDAL; PERINEURAL AS NEEDED
Status: DISCONTINUED | OUTPATIENT
Start: 2020-12-22 | End: 2020-12-22 | Stop reason: HOSPADM

## 2020-12-22 RX ORDER — DIPHENHYDRAMINE HYDROCHLORIDE 50 MG/ML
12.5 INJECTION, SOLUTION INTRAMUSCULAR; INTRAVENOUS
Status: DISCONTINUED | OUTPATIENT
Start: 2020-12-22 | End: 2020-12-23 | Stop reason: HOSPADM

## 2020-12-22 RX ORDER — NEOSTIGMINE METHYLSULFATE 1 MG/ML
INJECTION, SOLUTION INTRAVENOUS AS NEEDED
Status: DISCONTINUED | OUTPATIENT
Start: 2020-12-22 | End: 2020-12-22 | Stop reason: HOSPADM

## 2020-12-22 RX ORDER — KETAMINE HYDROCHLORIDE 50 MG/ML
INJECTION, SOLUTION INTRAMUSCULAR; INTRAVENOUS AS NEEDED
Status: DISCONTINUED | OUTPATIENT
Start: 2020-12-22 | End: 2020-12-22 | Stop reason: HOSPADM

## 2020-12-22 RX ORDER — SUCRALFATE 1 G/1
1 TABLET ORAL
Status: DISCONTINUED | OUTPATIENT
Start: 2020-12-22 | End: 2020-12-23 | Stop reason: HOSPADM

## 2020-12-22 RX ORDER — LIDOCAINE HYDROCHLORIDE ANHYDROUS AND DEXTROSE MONOHYDRATE .4; 5 G/100ML; G/100ML
INJECTION, SOLUTION INTRAVENOUS
Status: DISCONTINUED | OUTPATIENT
Start: 2020-12-22 | End: 2020-12-22 | Stop reason: HOSPADM

## 2020-12-22 RX ORDER — PANTOPRAZOLE SODIUM 40 MG/10ML
40 INJECTION, POWDER, LYOPHILIZED, FOR SOLUTION INTRAVENOUS DAILY
Status: DISCONTINUED | OUTPATIENT
Start: 2020-12-23 | End: 2020-12-23 | Stop reason: HOSPADM

## 2020-12-22 RX ORDER — SODIUM CHLORIDE AND POTASSIUM CHLORIDE .9; .15 G/100ML; G/100ML
SOLUTION INTRAVENOUS CONTINUOUS
Status: DISCONTINUED | OUTPATIENT
Start: 2020-12-22 | End: 2020-12-23

## 2020-12-22 RX ORDER — SCOLOPAMINE TRANSDERMAL SYSTEM 1 MG/1
1 PATCH, EXTENDED RELEASE TRANSDERMAL ONCE
Status: COMPLETED | OUTPATIENT
Start: 2020-12-22 | End: 2020-12-22

## 2020-12-22 RX ORDER — METOCLOPRAMIDE 10 MG/1
5 TABLET ORAL ONCE
Status: COMPLETED | OUTPATIENT
Start: 2020-12-22 | End: 2020-12-22

## 2020-12-22 RX ORDER — NEBIVOLOL 5 MG/1
20 TABLET ORAL DAILY
Status: DISCONTINUED | OUTPATIENT
Start: 2020-12-23 | End: 2020-12-23 | Stop reason: HOSPADM

## 2020-12-22 RX ORDER — OXYCODONE HYDROCHLORIDE 5 MG/1
5 TABLET ORAL
Status: DISCONTINUED | OUTPATIENT
Start: 2020-12-22 | End: 2020-12-22 | Stop reason: HOSPADM

## 2020-12-22 RX ORDER — LIDOCAINE HYDROCHLORIDE ANHYDROUS AND DEXTROSE MONOHYDRATE .4; 5 G/100ML; G/100ML
1 INJECTION, SOLUTION INTRAVENOUS CONTINUOUS
Status: DISCONTINUED | OUTPATIENT
Start: 2020-12-22 | End: 2020-12-22 | Stop reason: HOSPADM

## 2020-12-22 RX ORDER — NALOXONE HYDROCHLORIDE 0.4 MG/ML
0.1 INJECTION, SOLUTION INTRAMUSCULAR; INTRAVENOUS; SUBCUTANEOUS AS NEEDED
Status: DISCONTINUED | OUTPATIENT
Start: 2020-12-22 | End: 2020-12-22 | Stop reason: HOSPADM

## 2020-12-22 RX ORDER — OXYCODONE HYDROCHLORIDE 5 MG/1
5 TABLET ORAL
Status: DISCONTINUED | OUTPATIENT
Start: 2020-12-22 | End: 2020-12-23 | Stop reason: HOSPADM

## 2020-12-22 RX ORDER — SIMETHICONE 80 MG
80 TABLET,CHEWABLE ORAL
Status: CANCELLED | OUTPATIENT
Start: 2020-12-22

## 2020-12-22 RX ORDER — ROCURONIUM BROMIDE 10 MG/ML
INJECTION, SOLUTION INTRAVENOUS AS NEEDED
Status: DISCONTINUED | OUTPATIENT
Start: 2020-12-22 | End: 2020-12-22 | Stop reason: HOSPADM

## 2020-12-22 RX ORDER — SODIUM CHLORIDE 9 MG/ML
INJECTION, SOLUTION INTRAVENOUS
Status: DISCONTINUED | OUTPATIENT
Start: 2020-12-22 | End: 2020-12-22 | Stop reason: HOSPADM

## 2020-12-22 RX ORDER — LIDOCAINE HYDROCHLORIDE ANHYDROUS AND DEXTROSE MONOHYDRATE .4; 5 G/100ML; G/100ML
1 INJECTION, SOLUTION INTRAVENOUS CONTINUOUS
Status: DISCONTINUED | OUTPATIENT
Start: 2020-12-22 | End: 2020-12-23

## 2020-12-22 RX ORDER — HEPARIN SODIUM 5000 [USP'U]/ML
5000 INJECTION, SOLUTION INTRAVENOUS; SUBCUTANEOUS EVERY 8 HOURS
Status: DISCONTINUED | OUTPATIENT
Start: 2020-12-22 | End: 2020-12-23 | Stop reason: HOSPADM

## 2020-12-22 RX ORDER — OXYCODONE HYDROCHLORIDE 5 MG/1
10 TABLET ORAL
Status: COMPLETED | OUTPATIENT
Start: 2020-12-22 | End: 2020-12-22

## 2020-12-22 RX ADMIN — Medication 10 MG: at 16:25

## 2020-12-22 RX ADMIN — HYDROMORPHONE HYDROCHLORIDE 0.5 MG: 1 INJECTION, SOLUTION INTRAMUSCULAR; INTRAVENOUS; SUBCUTANEOUS at 19:03

## 2020-12-22 RX ADMIN — APREPITANT 40 MG: 40 CAPSULE ORAL at 13:24

## 2020-12-22 RX ADMIN — GABAPENTIN 200 MG: 100 CAPSULE ORAL at 20:21

## 2020-12-22 RX ADMIN — LIDOCAINE HYDROCHLORIDE 1 MG/KG/HR: 4 INJECTION, SOLUTION INTRAVENOUS at 19:05

## 2020-12-22 RX ADMIN — Medication 5 MG: at 16:36

## 2020-12-22 RX ADMIN — NALOXEGOL OXALATE 25 MG: 25 TABLET, FILM COATED ORAL at 13:24

## 2020-12-22 RX ADMIN — KETOROLAC TROMETHAMINE 30 MG: 30 INJECTION, SOLUTION INTRAMUSCULAR at 20:21

## 2020-12-22 RX ADMIN — POTASSIUM CHLORIDE AND SODIUM CHLORIDE: 900; 150 INJECTION, SOLUTION INTRAVENOUS at 20:22

## 2020-12-22 RX ADMIN — ONDANSETRON 4 MG: 2 INJECTION INTRAMUSCULAR; INTRAVENOUS at 22:35

## 2020-12-22 RX ADMIN — KETAMINE HYDROCHLORIDE 30 MG: 50 INJECTION INTRAMUSCULAR; INTRAVENOUS at 16:46

## 2020-12-22 RX ADMIN — MIDAZOLAM 2 MG: 1 INJECTION INTRAMUSCULAR; INTRAVENOUS at 14:59

## 2020-12-22 RX ADMIN — PROPOFOL 300 MG: 10 INJECTION, EMULSION INTRAVENOUS at 15:50

## 2020-12-22 RX ADMIN — SODIUM CHLORIDE: 900 INJECTION, SOLUTION INTRAVENOUS at 15:37

## 2020-12-22 RX ADMIN — GLYCOPYRROLATE 0.6 MG: 0.2 INJECTION, SOLUTION INTRAMUSCULAR; INTRAVENOUS at 16:36

## 2020-12-22 RX ADMIN — CEFAZOLIN 3 G: 1 INJECTION, POWDER, FOR SOLUTION INTRAVENOUS at 15:39

## 2020-12-22 RX ADMIN — LIDOCAINE HYDROCHLORIDE 100 MG: 20 INJECTION, SOLUTION EPIDURAL; INFILTRATION; INTRACAUDAL; PERINEURAL at 15:50

## 2020-12-22 RX ADMIN — FENTANYL CITRATE 100 MCG: 50 INJECTION INTRAMUSCULAR; INTRAVENOUS at 15:50

## 2020-12-22 RX ADMIN — Medication 10 MG: at 16:06

## 2020-12-22 RX ADMIN — DEXAMETHASONE SODIUM PHOSPHATE 10 MG: 4 INJECTION, SOLUTION INTRAMUSCULAR; INTRAVENOUS at 16:02

## 2020-12-22 RX ADMIN — SODIUM CHLORIDE, SODIUM LACTATE, POTASSIUM CHLORIDE, AND CALCIUM CHLORIDE 999 ML/HR: 600; 310; 30; 20 INJECTION, SOLUTION INTRAVENOUS at 14:11

## 2020-12-22 RX ADMIN — SUCRALFATE 1 G: 1 TABLET ORAL at 22:34

## 2020-12-22 RX ADMIN — KETAMINE HYDROCHLORIDE 70 MG: 50 INJECTION INTRAMUSCULAR; INTRAVENOUS at 15:55

## 2020-12-22 RX ADMIN — OXYCODONE HYDROCHLORIDE 10 MG: 5 TABLET ORAL at 17:26

## 2020-12-22 RX ADMIN — LIDOCAINE HYDROCHLORIDE 1 MG/KG/HR: 4 INJECTION, SOLUTION INTRAVENOUS at 16:04

## 2020-12-22 RX ADMIN — GLYCOPYRROLATE 0.2 MG: 0.2 INJECTION, SOLUTION INTRAMUSCULAR; INTRAVENOUS at 16:03

## 2020-12-22 RX ADMIN — METOCLOPRAMIDE 5 MG: 10 TABLET ORAL at 13:24

## 2020-12-22 RX ADMIN — ACETAMINOPHEN 1000 MG: 500 TABLET, FILM COATED ORAL at 20:21

## 2020-12-22 RX ADMIN — ACETAMINOPHEN 1000 MG: 500 TABLET ORAL at 13:24

## 2020-12-22 RX ADMIN — ROCURONIUM BROMIDE 50 MG: 10 INJECTION, SOLUTION INTRAVENOUS at 15:50

## 2020-12-22 RX ADMIN — ONDANSETRON 4 MG: 2 INJECTION INTRAMUSCULAR; INTRAVENOUS at 19:03

## 2020-12-22 RX ADMIN — LIDOCAINE HYDROCHLORIDE 0.1 ML: 10 INJECTION, SOLUTION INFILTRATION; PERINEURAL at 13:37

## 2020-12-22 RX ADMIN — ONDANSETRON 4 MG: 2 INJECTION INTRAMUSCULAR; INTRAVENOUS at 16:17

## 2020-12-22 RX ADMIN — HEPARIN SODIUM 5000 UNITS: 5000 INJECTION INTRAVENOUS; SUBCUTANEOUS at 22:35

## 2020-12-22 NOTE — ANESTHESIA PREPROCEDURE EVALUATION
Relevant Problems   No relevant active problems       Anesthetic History   No history of anesthetic complications            Review of Systems / Medical History  Patient summary reviewed, nursing notes reviewed and pertinent labs reviewed    Pulmonary  Within defined limits                 Neuro/Psych   Within defined limits           Cardiovascular    Hypertension: well controlled              Exercise tolerance: >4 METS     GI/Hepatic/Renal  Within defined limits              Endo/Other        Morbid obesity     Other Findings            Physical Exam    Airway  Mallampati: II  TM Distance: 4 - 6 cm  Neck ROM: normal range of motion   Mouth opening: Normal     Cardiovascular  Regular rate and rhythm,  S1 and S2 normal,  no murmur, click, rub, or gallop             Dental  No notable dental hx       Pulmonary  Breath sounds clear to auscultation               Abdominal         Other Findings            Anesthetic Plan    ASA: 3  Anesthesia type: general          Induction: Intravenous  Anesthetic plan and risks discussed with: Patient

## 2020-12-22 NOTE — PERIOP NOTES
Patient drank Pre-Surgical drink as instructed:   1 pre-surgical drink last night, and 1/2 bottle this am.     Warmer placed on patient's bed. Warm IV fluids infusing in pre-op per ERAS protocol.

## 2020-12-22 NOTE — OP NOTES
Laparascopic Sleeve Gastrectomy Operative Report    Date of Surgery: 12/22/2020     Preoperative Diagnosis: Morbid Obesity with a BMI of 47    Postoperative Diagnosis: Same as the above    Procedure: Procedure(s):  ERAS/ GASTRECTOMY SLEEVE LAPAROSCOPIC     Surgeon(s) and Role:     Myrna Chaves MD - Primary     Anesthesia:  GETA plus local     Surgical Assistant: Ele White NP was the assistant for this procedure. Her assistance was necessary for proper positioning, expertise with the use of an angled scope, exposure, retraction and wound closure. NAME OF PROCEDURE: LAPAROSCOPIC SLEEVE GASTRECTOMY    ESTIMATED BLOOD LOSS: 25 mL. SPECIMENS: Segment of the stomach. HISTORY: This is a 39 y.o. male who came to the surgical weight loss  clinic at [facility] of his own volition for consideration of bariatric surgery. The patient went to an information session, met with the dietician and psychologist. he came in and I discussed a gastric bypass versus a sleeve gastrectomy with the patient. I recommended a sleeve gastrectomy. The  patient agreed and signed a consent form. For risks, I mentioned risks of bleeding, infection, anesthesia, injury to the esophagus, stomach, small bowel, liver, spleen, large bowel. I also went through risks of myocardial infarction, pneumonia and leak from the staple line of the sleeve gastrectomy. I said that a leak could produce peritonitis, multi-system organ failure, and even death. There is a 1% nationwide mortality rate for this procedure. The patient understood and still wished to proceed. PROCEDURE IN DETAIL: The patient was brought to an operating room at the [address] [place] [address] where general endotracheal anesthesia was administered without complications. He received 2 grams of Ancef as prophylactic antibiotic coverage. The nursing staff applied sequential compression devices and inserted a Waite catheter.  The abdomen was prepped and draped in the usual sterile manner. An incision was made superior into the patient's left of the umbilicus overlying the left rectus abdominis muscle. An Optiview trocar was placed in the peritoneal cavity under direct vision with 0 degree 10 mm scope. Once in the peritoneal cavity, the abdomen was insufflated to 15 mmHg using carbon dioxide gas. The table was placed in reverse Trendelenburg position. A 5 and 15 mm trocar placed in the right upper quadrant under direct vision. A 5 mm trocar was placed in the epigastric region and then removed. Through this tract, a Lisandro liver retractor was placed and used to retract the left lobe of the liver throughout the remainder of the procedure. It was attached to an Omni self-retaining retracting device. A 5 mm trocar was placed in the left upper quadrant to be used for retraction throughout the procedure. We found the pylorus and measured 6 cm proximal to the pylorus in an area along the greater curvature. The nurse anesthetist placed a 40 Fr Visi-G bougie/calibration tube which was manipulated along the lesser curvature. The device was attached to suction which fixed it in place. We made a small aperture with the Enseal Trio device. We then took the gastrocolic ligament from this point all the way up to the left afshin of the diaphragm. We cleared off some posterior adhesions with the Enseal device as well. We then used the [place] stapling device. The first cartridge was a black staple cartridge. We were able to push the bougie into the pre-pyloric channel and along the lesser curvature, where it remained. The next  staple cartridge was green followed by blue cartridges until the stomach was completely divided along the calibration tube. We checked the staple line. Some small bleeding points were cauterized using electrocautery with the spatula.  The remnant of the stomach had a stitch of 0 Ethibond placed in the antrum area and the excised stomach was then placed into a large [employer]. The Endopouch was closed and brought up through the 15 mm trocar incision which was a enlarged with the electrocautery. The bag was removed including the excised stomach which was sent to pathology. The 15 mm trocar was returned. We checked the staple line again and there was no evidence of bleeding. The bougie tube was removed and then replaced. It passed easily from the distal esophagus into the proximal stomach and was advanced back into the pre-pyloric channel under direct vision. There was no evidence of any staple line leak or bleeding. There was  evidence of any bleeding along the staple line which was stopped with 10 mm clips and spot cautery. The bougie was removed for the last time. We removed all the trocars under direct vision with no evidence of bleeding from the trocar sites. The fascia of the 15 mm trocar was closed with interrupted sutures of 0 Vicryl. The skin was closed with subcuticular sutures of 4-0 Monocryl. The patient received 30 mL 0.5% Marcaine in 5 mL doses to each incision site. Mastisol and Steri-Strips were placed over the wounds. The patient tolerated procedure well. He was extubated and brought to the recovery room in stable condition.         Arabella Ceron MD

## 2020-12-22 NOTE — INTERVAL H&P NOTE
Update History & Physical 
 
The Patient's History and Physical of 12/08/20 was reviewed with the patient and I examined the patient. There was no change. The surgical site was confirmed by the patient and me. Plan:  The risk, benefits, expected outcome, and alternative to the recommended procedure have been discussed with the patient. Patient understands and wants to proceed with the procedure.  
 
Electronically signed by Beny Rousseau MD on 12/22/2020 at 12:46 PM

## 2020-12-22 NOTE — PERIOP NOTES
TRANSFER - OUT REPORT:    Verbal report given to Margot Goldman RN(name) on Gibson Zaidi  being transferred to [facility](unit) for routine post - op       Report consisted of patients Situation, Background, Assessment and   Recommendations(SBAR). Information from the following report(s) SBAR, Kardex, OR Summary, Intake/Output, MAR and Cardiac Rhythm NSR was reviewed with the receiving nurse. Lines:   Peripheral IV 12/22/20 Left;Posterior Hand (Active)   Site Assessment Clean, dry, & intact 12/22/20 1725   Phlebitis Assessment 0 12/22/20 1725   Infiltration Assessment 0 12/22/20 1725   Dressing Status Clean, dry, & intact 12/22/20 1725   Dressing Type Transparent;Tape 12/22/20 1725   Hub Color/Line Status Green; Infusing 12/22/20 1725   Alcohol Cap Used No 12/22/20 1336        Opportunity for questions and clarification was provided.       Patient transported with:   O2 @ 2 liters

## 2020-12-23 VITALS
HEART RATE: 61 BPM | WEIGHT: 315 LBS | OXYGEN SATURATION: 99 % | HEIGHT: 69 IN | RESPIRATION RATE: 16 BRPM | SYSTOLIC BLOOD PRESSURE: 101 MMHG | TEMPERATURE: 98.6 F | DIASTOLIC BLOOD PRESSURE: 68 MMHG | BODY MASS INDEX: 46.65 KG/M2

## 2020-12-23 LAB
ANION GAP SERPL CALC-SCNC: 9 MMOL/L (ref 7–16)
BASOPHILS # BLD: 0 K/UL (ref 0–0.2)
BASOPHILS NFR BLD: 0 % (ref 0–2)
BUN SERPL-MCNC: 14 MG/DL (ref 6–23)
CALCIUM SERPL-MCNC: 8.4 MG/DL (ref 8.3–10.4)
CHLORIDE SERPL-SCNC: 101 MMOL/L (ref 98–107)
CO2 SERPL-SCNC: 26 MMOL/L (ref 21–32)
CREAT SERPL-MCNC: 0.98 MG/DL (ref 0.8–1.5)
DIFFERENTIAL METHOD BLD: ABNORMAL
EOSINOPHIL # BLD: 0 K/UL (ref 0–0.8)
EOSINOPHIL NFR BLD: 0 % (ref 0.5–7.8)
ERYTHROCYTE [DISTWIDTH] IN BLOOD BY AUTOMATED COUNT: 12.7 % (ref 11.9–14.6)
GLUCOSE SERPL-MCNC: 132 MG/DL (ref 65–100)
HCT VFR BLD AUTO: 40.7 % (ref 41.1–50.3)
HGB BLD-MCNC: 13.6 G/DL (ref 13.6–17.2)
IMM GRANULOCYTES # BLD AUTO: 0 K/UL (ref 0–0.5)
IMM GRANULOCYTES NFR BLD AUTO: 0 % (ref 0–5)
LYMPHOCYTES # BLD: 1 K/UL (ref 0.5–4.6)
LYMPHOCYTES NFR BLD: 9 % (ref 13–44)
MCH RBC QN AUTO: 27.6 PG (ref 26.1–32.9)
MCHC RBC AUTO-ENTMCNC: 33.4 G/DL (ref 31.4–35)
MCV RBC AUTO: 82.6 FL (ref 79.6–97.8)
MONOCYTES # BLD: 0.2 K/UL (ref 0.1–1.3)
MONOCYTES NFR BLD: 2 % (ref 4–12)
NEUTS SEG # BLD: 10 K/UL (ref 1.7–8.2)
NEUTS SEG NFR BLD: 88 % (ref 43–78)
NRBC # BLD: 0 K/UL (ref 0–0.2)
PLATELET # BLD AUTO: 255 K/UL (ref 150–450)
PMV BLD AUTO: 9.5 FL (ref 9.4–12.3)
POTASSIUM SERPL-SCNC: 3.8 MMOL/L (ref 3.5–5.1)
RBC # BLD AUTO: 4.93 M/UL (ref 4.23–5.6)
SODIUM SERPL-SCNC: 136 MMOL/L (ref 136–145)
WBC # BLD AUTO: 11.3 K/UL (ref 4.3–11.1)

## 2020-12-23 PROCEDURE — 80048 BASIC METABOLIC PNL TOTAL CA: CPT

## 2020-12-23 PROCEDURE — 85025 COMPLETE CBC W/AUTO DIFF WBC: CPT

## 2020-12-23 PROCEDURE — 97161 PT EVAL LOW COMPLEX 20 MIN: CPT

## 2020-12-23 PROCEDURE — 36415 COLL VENOUS BLD VENIPUNCTURE: CPT

## 2020-12-23 PROCEDURE — 74011250636 HC RX REV CODE- 250/636: Performed by: NURSE PRACTITIONER

## 2020-12-23 PROCEDURE — 97110 THERAPEUTIC EXERCISES: CPT

## 2020-12-23 PROCEDURE — 74011250637 HC RX REV CODE- 250/637: Performed by: NURSE PRACTITIONER

## 2020-12-23 PROCEDURE — 94760 N-INVAS EAR/PLS OXIMETRY 1: CPT

## 2020-12-23 RX ADMIN — ACETAMINOPHEN 1000 MG: 500 TABLET, FILM COATED ORAL at 03:42

## 2020-12-23 RX ADMIN — KETOROLAC TROMETHAMINE 30 MG: 30 INJECTION, SOLUTION INTRAMUSCULAR at 03:42

## 2020-12-23 RX ADMIN — ONDANSETRON 4 MG: 2 INJECTION INTRAMUSCULAR; INTRAVENOUS at 10:00

## 2020-12-23 RX ADMIN — ONDANSETRON 4 MG: 2 INJECTION INTRAMUSCULAR; INTRAVENOUS at 03:42

## 2020-12-23 RX ADMIN — SUCRALFATE 1 G: 1 TABLET ORAL at 10:00

## 2020-12-23 RX ADMIN — GABAPENTIN 200 MG: 100 CAPSULE ORAL at 10:00

## 2020-12-23 RX ADMIN — KETOROLAC TROMETHAMINE 30 MG: 30 INJECTION, SOLUTION INTRAMUSCULAR at 10:01

## 2020-12-23 RX ADMIN — ONDANSETRON 4 MG: 2 INJECTION INTRAMUSCULAR; INTRAVENOUS at 07:00

## 2020-12-23 RX ADMIN — SUCRALFATE 1 G: 1 TABLET ORAL at 06:06

## 2020-12-23 RX ADMIN — NALOXEGOL OXALATE 25 MG: 25 TABLET, FILM COATED ORAL at 06:06

## 2020-12-23 RX ADMIN — HEPARIN SODIUM 5000 UNITS: 5000 INJECTION INTRAVENOUS; SUBCUTANEOUS at 06:05

## 2020-12-23 RX ADMIN — ACETAMINOPHEN 1000 MG: 500 TABLET, FILM COATED ORAL at 10:00

## 2020-12-23 NOTE — PROGRESS NOTES
Pt in bed resting, bed low and locked, call light within reach, gripper socks on, side rails up x3. Shift assessment complete, 5 abdominal lap site are clean dry and intact. no needs at this time, will continue to monitor.

## 2020-12-23 NOTE — PROGRESS NOTES
General Surgery Progress Note    12/23/2020    Admit Date: 12/22/2020    Subjective:     Surgery POD #1  The patient voided, tolerated liquids, ambulated and has minimal pain. He would like to go home today    Objective:     Visit Vitals  /87 (BP 1 Location: Left arm, BP Patient Position: At rest)   Pulse 94   Temp 97.8 °F (36.6 °C)   Resp 16   Ht 5' 9\" (1.753 m)   Wt 333 lb 6.4 oz (151.2 kg)   SpO2 92%   BMI 49.23 kg/m²         Intake/Output Summary (Last 24 hours) at 12/23/2020 0711  Last data filed at 12/22/2020 2230  Gross per 24 hour   Intake 2230 ml   Output 650 ml   Net 1580 ml        EXAM:  ABD soft, obese, incisional tenderness, active BS'S. Wound intact without signs of infection. Data Review    Recent Results (from the past 24 hour(s))   METABOLIC PANEL, BASIC    Collection Time: 12/23/20  3:45 AM   Result Value Ref Range    Sodium 136 136 - 145 mmol/L    Potassium 3.8 3.5 - 5.1 mmol/L    Chloride 101 98 - 107 mmol/L    CO2 26 21 - 32 mmol/L    Anion gap 9 7 - 16 mmol/L    Glucose 132 (H) 65 - 100 mg/dL    BUN 14 6 - 23 MG/DL    Creatinine 0.98 0.8 - 1.5 MG/DL    GFR est AA >60 >60 ml/min/1.73m2    GFR est non-AA >60 >60 ml/min/1.73m2    Calcium 8.4 8.3 - 10.4 MG/DL   CBC WITH AUTOMATED DIFF    Collection Time: 12/23/20  3:45 AM   Result Value Ref Range    WBC 11.3 (H) 4.3 - 11.1 K/uL    RBC 4.93 4.23 - 5.6 M/uL    HGB 13.6 13.6 - 17.2 g/dL    HCT 40.7 (L) 41.1 - 50.3 %    MCV 82.6 79.6 - 97.8 FL    MCH 27.6 26.1 - 32.9 PG    MCHC 33.4 31.4 - 35.0 g/dL    RDW 12.7 11.9 - 14.6 %    PLATELET 128 303 - 837 K/uL    MPV 9.5 9.4 - 12.3 FL    ABSOLUTE NRBC 0.00 0.0 - 0.2 K/uL    DF AUTOMATED      NEUTROPHILS 88 (H) 43 - 78 %    LYMPHOCYTES 9 (L) 13 - 44 %    MONOCYTES 2 (L) 4.0 - 12.0 %    EOSINOPHILS 0 (L) 0.5 - 7.8 %    BASOPHILS 0 0.0 - 2.0 %    IMMATURE GRANULOCYTES 0 0.0 - 5.0 %    ABS. NEUTROPHILS 10.0 (H) 1.7 - 8.2 K/UL    ABS. LYMPHOCYTES 1.0 0.5 - 4.6 K/UL    ABS.  MONOCYTES 0.2 0.1 - 1.3 K/UL    ABS. EOSINOPHILS 0.0 0.0 - 0.8 K/UL    ABS. BASOPHILS 0.0 0.0 - 0.2 K/UL    ABS. IMM. GRANS. 0.0 0.0 - 0.5 K/UL        Hospital Problems  Date Reviewed: 12/8/2020          Codes Class Noted POA    * (Principal) Morbid obesity with BMI of 45.0-49.9, adult ([facility] ICD-10-CM: E66.01, Z68.42  ICD-9-CM: 278.01, V85.42  12/22/2020 Yes          1. Discharge to home. 2. F/u with me on 12/29/20.   Tri Espinosa MD.

## 2020-12-23 NOTE — PROGRESS NOTES
Problem: Mobility Impaired (Adult and Pediatric)  Goal: *Acute Goals and Plan of Care (Insert Text)  Outcome: Progressing Towards Goal  Note: No goals set as patient doing well and going home soon     PHYSICAL THERAPY: Initial Assessment, Discharge, and AM 12/23/2020  INPATIENT:    Payor: Emory Suazo / Plan: [address] / Product Type: PPO /       NAME/AGE/GENDER: Wilbert Ma is a 39 y.o. male   PRIMARY DIAGNOSIS: Morbid obesity ([facility] [E66.01]  Morbid obesity with BMI of 45.0-49.9, adult ([facility] [E66.01, Z68.42] Morbid obesity with BMI of 45.0-49.9, adult ([facility] Morbid obesity with BMI of 45.0-49.9, adult ([facility]  Procedure(s) (LRB):  ERAS/ GASTRECTOMY SLEEVE LAPAROSCOPIC (N/A)  1 Day Post-Op  ICD-10: Treatment Diagnosis:    Other abnormalities of gait and mobility (R26.89)   Precaution/Allergies:  Patient has no known allergies. ASSESSMENT:     Mr. Gotti Jo presents as doing well this am with no complaints s/p Procedure(s) (LRB):  ERAS/ GASTRECTOMY SLEEVE LAPAROSCOPIC (N/A)  He was up in bathroom on contact with wife present. He ambulated the entire hospital floor without difficulty and did some surgical weight loss exercises without a problem. He only reports some soreness in his abdomen. He is eager to go home and has no complaints. He and wife had no questions or concerns. This section established at most recent assessment   PROBLEM LIST (Impairments causing functional limitations):  Pt. Going home this am   INTERVENTIONS PLANNED: (Benefits and precautions of physical therapy have been discussed with the patient.)  Pt.  Going home this am     TREATMENT PLAN: Frequency/Duration: Pt. Going home this am  Rehabilitation Potential For Stated Goals: Excellent     REHAB RECOMMENDATIONS (at time of discharge pending progress):    Placement:  home  Equipment:   None at this time              HISTORY:   History of Present Injury/Illness (Reason for Referral):  S/p gastric sleeve  Past Medical History/Comorbidities:   Mr. Jayden Andrews  has a past medical history of Hypertension, Morbid obesity ([address] and Renal disease. Mr. Jayden Andrews  has a past surgical history that includes hx orthopaedic (,, ) and hx other surgical.  Social History/Living Environment:      Prior Level of Function/Work/Activity:  independent     Number of Personal Factors/Comorbidities that affect the Plan of Care: 1-2: MODERATE COMPLEXITY   EXAMINATION:   Most Recent Physical Functioning:   Gross Assessment:  AROM: Within functional limits  Strength: Generally decreased, functional               Posture:     Balance:  Sitting: Intact  Standing: Intact Bed Mobility:  Supine to Sit: (up on contact)  Wheelchair Mobility:     Transfers:  Sit to Stand: Independent;Supervision  Stand to Sit: Independent;Supervision  Gait:     Speed/Gisela: Delayed  Gait Abnormalities: Trunk sway increased  Distance (ft): 650 Feet (ft)  Ambulation - Level of Assistance: Independent;Supervision  Interventions: Safety awareness training;Verbal cues      Body Structures Involved:  Bones  Joints  Muscles  Ligaments Body Functions Affected: Movement Related Activities and Participation Affected: Mobility   Number of elements that affect the Plan of Care: 3: MODERATE COMPLEXITY   CLINICAL PRESENTATION:   Presentation: Stable and uncomplicated: LOW COMPLEXITY   CLINICAL DECISION MAKIN [facility] [address] AM-PAC 6 Clicks   Basic Mobility Inpatient Short Form  How much difficulty does the patient currently have. .. Unable A Lot A Little None   1. Turning over in bed (including adjusting bedclothes, sheets and blankets)? [] 1   [] 2   [] 3   [x] 4   2. Sitting down on and standing up from a chair with arms ( e.g., wheelchair, bedside commode, etc.)   [] 1   [] 2   [] 3   [x] 4   3. Moving from lying on back to sitting on the side of the bed? [] 1   [] 2   [] 3   [x] 4   How much help from another person does the patient currently need. ..  Total A Lot A Little None   4. Moving to and from a bed to a chair (including a wheelchair)? [] 1   [] 2   [] 3   [x] 4   5. Need to walk in hospital room? [] 1   [] 2   [] 3   [x] 4   6. Climbing 3-5 steps with a railing? [] 1   [] 2   [] 3   [x] 4   © 2007, Trustees of [address] [place] [address], under license to [employer]. All rights reserved      Score:  Initial: 24 Most Recent: X (Date: -- )    Interpretation of Tool:  Represents activities that are increasingly more difficult (i.e. Bed mobility, Transfers, Gait). Medical Necessity:     Pt. Going home this am.  Reason for Services/Other Comments:  Pt. Going home this am.   Use of outcome tool(s) and clinical judgement create a POC that gives a: Clear prediction of patient's progress: LOW COMPLEXITY            TREATMENT:   (In addition to Assessment/Re-Assessment sessions the following treatments were rendered)   Pre-treatment Symptoms/Complaints:  none  Pain: Initial:      Post Session:  minimal did not rate   assessment  Therapeutic Exercise: (8 Minutes):  Exercises per grid below to improve mobility and strength. Required minimal visual cues to promote proper body alignment, promote proper body posture, and promote proper body mechanics. Progressed range and repetitions as indicated. He did ankle pumps, long arc quads, seated march, elbow flexion, overhead press x 10-15 reps each. Braces/Orthotics/Lines/Etc:   O2 Device: Room air  Treatment/Session Assessment:    Response to Treatment:  did very well  Interdisciplinary Collaboration:   Registered Nurse  After treatment position/precautions:   Bed in low position  Caregiver at bedside  Call light within reach  RN notified  Family at bedside  Sitting on side of bed    Compliance with Program/Exercises: Compliant most of the time, Will assess as treatment progresses  Recommendations/Intent for next treatment session: \"Next visit will focus on  Pt. Going home this am \".   Total Treatment Duration:  PT Patient Time In/Time Out  Time In: 0915  Time Out: Todd Mojica 60, PT

## 2020-12-23 NOTE — ANESTHESIA POSTPROCEDURE EVALUATION
Procedure(s):  ERAS/ GASTRECTOMY SLEEVE LAPAROSCOPIC.     general    Anesthesia Post Evaluation      Multimodal analgesia: multimodal analgesia used between 6 hours prior to anesthesia start to PACU discharge  Patient location during evaluation: PACU  Patient participation: complete - patient participated  Level of consciousness: awake and alert  Pain management: adequate  Airway patency: patent  Anesthetic complications: no  Cardiovascular status: acceptable  Respiratory status: acceptable  Hydration status: acceptable  Post anesthesia nausea and vomiting:  controlled  Final Post Anesthesia Temperature Assessment:  Normothermia (36.0-37.5 degrees C)      INITIAL Post-op Vital signs:   Vitals Value Taken Time   /81 12/22/20 1742   Temp 37 °C (98.6 °F) 12/22/20 1658   Pulse 73 12/22/20 1742   Resp 16 12/22/20 1742   SpO2 94 % 12/22/20 1742

## 2020-12-23 NOTE — PROGRESS NOTES
Pt in bed resting quietly. Family at bedside. Pt sitting up in bed eating. Abdominal sites clean, dry and intact. Bowel sounds active in all quadrants. Respirations even, lung sounds clear. Pt denies pain. No needs expressed. Bed locked, in lowest position, call light within reach.

## 2020-12-23 NOTE — PROGRESS NOTES
Received to room 331. Wife at bedside. Pt is alert but very drowsy. Responds to loud voice and touch. Answers questions appropriately.

## 2020-12-23 NOTE — PROGRESS NOTES
TRANSFER - IN REPORT:    Verbal report received from Rosa Maria Mendez juan Jo  being received from PACU for routine post - op      Report consisted of patients Situation, Background, Assessment and   Recommendations(SBAR). Information from the following report(s) SBAR was reviewed with the receiving nurse. Opportunity for questions and clarification was provided. Assessment completed upon patients arrival to unit and care assumed.

## 2020-12-23 NOTE — PROGRESS NOTES
Care Management Interventions  PCP Verified by CM: Yes  Physical Therapy Consult: Yes  Current Support Network: Own Home, Lives with Spouse  Discharge Location  Discharge Placement: Home    Chart screened by  for discharge planning. No needs identified at this time. Please consult  if any new issues arise.

## 2020-12-23 NOTE — DISCHARGE INSTRUCTIONS
1. Liquid diet with two protein shakes per day until seen for first post-op visit. Try to get at least 60 grams of protein per day. 2. Showering is allowed, but no tub baths or swimming. 3. Drainage is common from the wounds. Change the dressings as needed. Call our office if the wounds become reddened, tender, feel warm to the touch or pus starts to drain from the wound. 4. Take prescribed pain medication as directed, usually Percocet, Dilaudid, Norco or Ultram. Take over the counter medication for minor pain. 5. Ice may be applied intermittently to the surgical site or sites. 6. Call or office, [phone], if problems arise. 7. Follow up in the office at the assigned time. 8. Resume all medications as taken per surgery, unless specifically instructed not to take certain ones. 9. No lifting more than 25 pounds until told otherwise. 10. Driving is allowed 3 days after surgery as long as you feel comfortable enough to drive and have not taken any prescription pain medication prior to driving. 11. Leave Steri-strips in place until seen in the office. If the strips start to curl up, fall off or begin to have an odor, then gently remove the strips. 12. Start Fiber supplement daily    13. Start Constipation regimen: 100mg Colace twice daily, 17g Miralax twice daily. Continue until regular bowel pattern is established. If diarrhea occurs please discontinue regimen. Patient Education        Gastric Sleeve Surgery: What to Expect at Home  Your Recovery  Sleeve gastrectomy is surgery to remove part of the stomach to help with weight loss. The surgery, which is also called gastric sleeve surgery, limits the amount of food your stomach can hold. You will have some belly pain. You may need pain medicine for the first week or so after surgery. The cuts (incisions) that the doctor made may be tender and sore.   Because the surgery makes your stomach smaller, you will get full more quickly when you eat. It's important to think of this surgery as a tool to help you lose weight. It's not an instant fix. You will still need to eat a healthy diet and get regular exercise. This will help you reach your weight goal and avoid regaining the weight you lose. It's common to have many different emotions after this surgery. You may feel happy or excited as you begin to lose weight. But you may also feel overwhelmed or frustrated by the changes that you have to make in your diet, activity, and lifestyle. Talk with your doctor if you have concerns or questions. This care sheet gives you a general idea about how long it will take for you to recover. But each person recovers at a different pace. Follow the steps below to get better as quickly as possible. How can you care for yourself at home? Activity    · Rest when you feel tired. Getting enough sleep will help you recover.     · Try to walk each day. Start out by walking a little more than you did the day before. Bit by bit, increase the amount you walk. Walking boosts blood flow and helps prevent pneumonia and constipation.     · Avoid lifting anything that would make you strain. This may include heavy grocery bags and milk containers, a heavy briefcase or backpack, cat litter or dog food bags, a vacuum , or a child.     · Avoid strenuous activities, such as bicycle riding, jogging, weight lifting, or aerobic exercise, until your doctor says it is okay. Do not take part in any activity where you could be hit in the belly. This could be sports or playing with children.     · Hold a pillow over your incision when you cough or take deep breaths. This will support your belly and decrease your pain.     · Do breathing exercises at home as instructed by your doctor. This will help prevent pneumonia.     · You can shower. Pat the incision dry.  Do not take a bath for the first 2 weeks, or until your doctor tells you it is okay.     · You may drive when you are no longer taking prescription pain medicine and can quickly move your foot from the gas pedal to the brake. You must also be able to sit comfortably for a long period of time, even if you do not plan to go far. You might get caught in traffic.     · You will probably need to take 2 to 4 weeks off from work. It depends on the type of work you do and how you feel.     · Ask your doctor when it is okay for you to have sex. Diet    · Your doctor will give you specific instructions about what to eat after the surgery. For the first 2 to 6 weeks, you will need to follow a liquid or soft diet. Bit by bit, you will be able to add solid foods back into your diet.     · Have 5 or 6 small meals each day instead of 2 or 3 large meals. Chew each bite of food very well. Eat slowly. You may need to take 20 to 30 minutes to eat a meal.     · Avoid crusty breads, bagels, tough meats, raw vegetables, nuts and seeds (including crackers and breads that have nuts and seeds), and other foods that are hard to digest. If you feel full quickly, try to drink fluids between meals instead of with meals.     · Avoid fizzy drinks, such as soda pop.     · Avoid drinking with straws. This may help you swallow less air when you drink.     · Check with your doctor before drinking alcohol. Your body may absorb alcohol more quickly after surgery.     · You may notice that your bowel movements are not regular right after your surgery. This is common. Try to avoid constipation and straining with bowel movements. Take a fiber supplement every day. If you have not had a bowel movement after a couple of days, ask your doctor about taking a mild laxative. Medicines    · Your doctor will tell you if and when you can restart your medicines. He or she will also give you instructions about taking any new medicines.     · If you take aspirin or some other blood thinner, ask your doctor if and when to start taking it again.  Make sure that you understand exactly what your doctor wants you to do.     · Take pain medicines exactly as directed. ? If the doctor gave you a prescription medicine for pain, take it as prescribed. ? Do not take two or more pain medicines at the same time unless the doctor told you to. Many pain medicines contain acetaminophen, which is Tylenol. Too much acetaminophen (Tylenol) can be harmful. ? Do not take aspirin (Prakash, Bufferin), ibuprofen (Advil, Motrin), or naproxen (Aleve) until your doctor says it is okay.     · If you think your pain medicine is making you sick to your stomach:  ? Take your medicine after meals (unless your doctor has told you not to). ? Ask your doctor for a different pain medicine.     · If your doctor prescribed antibiotics, take them as directed. Do not stop taking them just because you feel better. You need to take the full course of antibiotics. Incision care    · If you have strips of tape on the incision, leave the tape on for a week or until it falls off.     · Wash the area daily with warm, soapy water and pat it dry. Don't use hydrogen peroxide or alcohol, which can slow healing. You may cover the area with a gauze bandage if it weeps or rubs against clothing. Change the bandage every day.     · Keep the area clean and dry. Follow-up care is a key part of your treatment and safety. Be sure to make and go to all appointments, and call your doctor if you are having problems. It's also a good idea to know your test results and keep a list of the medicines you take. When should you call for help? Call 911 anytime you think you may need emergency care. For example, call if:    · You passed out (lost consciousness).     · You are short of breath.    Call your doctor now or seek immediate medical care if:    · You have pain that does not get better after you take pain medicine.     · You cannot pass stool or gas.     · You are sick to your stomach and cannot drink fluids.     · You have loose stitches, or your incision comes open.     · You have signs of a blood clot, such as:  ? Pain in your calf, back of the knee, thigh, or groin. ? Redness and swelling in your leg or groin.     · You have signs of infection, such as:  ? Increased pain, swelling, warmth, or redness. ? Red streaks leading from the incision. ? Pus draining from the incision. ? A fever. Watch closely for changes in your health, and be sure to contact your doctor if you have any problems. Where can you learn more? Go to http://www.gray.com/  Enter L324 in the search box to learn more about \"Gastric Sleeve Surgery: What to Expect at Home. \"  Current as of: December 11, 2019               Content Version: 12.6  © 8326-5624 [employer]. Care instructions adapted under license by [employer] (which disclaims liability or warranty for this information). If you have questions about a medical condition or this instruction, always ask your healthcare professional. [address] any warranty or liability for your use of this information.

## (undated) DEVICE — DERMABOND SKIN ADH 0.7ML -- DERMABOND ADVANCED 12/BX

## (undated) DEVICE — SYR LR LCK 1ML GRAD NSAF 30ML --

## (undated) DEVICE — GASTRIC SLEEVE: Brand: MEDLINE INDUSTRIES, INC.

## (undated) DEVICE — 2, DISPOSABLE SUCTION/IRRIGATOR WITHOUT DISPOSABLE TIP: Brand: STRYKEFLOW

## (undated) DEVICE — LAPAROSCOPIC TROCAR SLEEVE/SINGLE USE: Brand: KII® OPTICAL ACCESS SYSTEM

## (undated) DEVICE — NEEDLE HYPO 21GA L1.5IN INTRAMUSCULAR S STL LATCH BVL UP

## (undated) DEVICE — DRAPE,TOP,102X53,STERILE: Brand: MEDLINE

## (undated) DEVICE — POUCH SPEC RETRV SHFT 15MM 13X23CM GRN POLYUR DBL RNG HNDL

## (undated) DEVICE — RELOAD STPL L60MM H1.5-3.6MM REG TISS BLU GRIPPING SURF B

## (undated) DEVICE — TROCAR: Brand: KII® SLEEVE

## (undated) DEVICE — TUBING INSUFFLATION SMK EVAC HI FLO SET PNEUMOCLEAR

## (undated) DEVICE — TROCAR: Brand: KII FIOS FIRST ENTRY

## (undated) DEVICE — CONTAINER SPEC HISTOLOGY 900ML POLYPR

## (undated) DEVICE — APPLICATOR LAP 35 CM 2 RIGID VISTASEAL

## (undated) DEVICE — TROCAR: Brand: KII® OPTICAL ACCESS SYSTEM

## (undated) DEVICE — BUTTON SWITCH PENCIL BLADE ELECTRODE, HOLSTER: Brand: EDGE

## (undated) DEVICE — SOLUTION IV 1000ML 0.9% SOD CHL

## (undated) DEVICE — SUTURE MCRYL SZ 4-0 L27IN ABSRB UD L19MM PS-2 1/2 CIR PRIM Y426H

## (undated) DEVICE — SINGLE BASIN: Brand: CARDINAL HEALTH

## (undated) DEVICE — VISIGI 3D®  CALIBRATION SYSTEM  SIZE 40FR SLEEVE/STD: Brand: BOEHRINGER® VISIGI™ 3D SLEEVE GASTRECTOMY CALIBRATION SYSTEM, SIZE 40FR

## (undated) DEVICE — GARMENT,MEDLINE,DVT,INT,CALF,LG, GEN2: Brand: MEDLINE

## (undated) DEVICE — TROCAR ENDOSCP L100MM DIA15MM BLDELSS STBL SL ENDOPATH XCEL

## (undated) DEVICE — STAPLER SKIN L440MM 32MM LNG 12 FIRING B FRM PWR + GRIPPING

## (undated) DEVICE — SEALANT FIBRIN 4 CC VISTASEAL

## (undated) DEVICE — (D)PREP SKN CHLRAPRP APPL 26ML -- CONVERT TO ITEM 371833

## (undated) DEVICE — REM POLYHESIVE ADULT PATIENT RETURN ELECTRODE: Brand: VALLEYLAB

## (undated) DEVICE — SEALANT TISS 4 CC FIBRIN VISTASEAL

## (undated) DEVICE — APPLIER CLP L SHFT DIA12MM 20 ROT MULT LIGACLP

## (undated) DEVICE — 2000CC GUARDIAN II: Brand: GUARDIAN

## (undated) DEVICE — SEALER TISS L45CM DIA5MM ARTC ADV BPLR STR TIP LAP APPRCH

## (undated) DEVICE — SUT ETHBND 0 30IN SH GRN --

## (undated) DEVICE — CARDINAL HEALTH FLEXIBLE LIGHT HANDLE COVER: Brand: CARDINAL HEALTH